# Patient Record
Sex: MALE | URBAN - NONMETROPOLITAN AREA
[De-identification: names, ages, dates, MRNs, and addresses within clinical notes are randomized per-mention and may not be internally consistent; named-entity substitution may affect disease eponyms.]

---

## 2023-08-16 ENCOUNTER — APPOINTMENT (RX ONLY)
Dept: URBAN - NONMETROPOLITAN AREA CLINIC 13 | Facility: CLINIC | Age: 28
Setting detail: DERMATOLOGY
End: 2023-08-16

## 2023-08-16 VITALS — WEIGHT: 170 LBS | HEIGHT: 75 IN

## 2023-08-16 DIAGNOSIS — D22 MELANOCYTIC NEVI: ICD-10-CM

## 2023-08-16 DIAGNOSIS — L73.2 HIDRADENITIS SUPPURATIVA: ICD-10-CM | Status: WORSENING

## 2023-08-16 PROBLEM — D22.5 MELANOCYTIC NEVI OF TRUNK: Status: ACTIVE | Noted: 2023-08-16

## 2023-08-16 PROCEDURE — 99204 OFFICE O/P NEW MOD 45 MIN: CPT

## 2023-08-16 PROCEDURE — ? PRESCRIPTION MEDICATION MANAGEMENT

## 2023-08-16 PROCEDURE — ? PRESCRIPTION

## 2023-08-16 PROCEDURE — ? COUNSELING

## 2023-08-16 RX ORDER — CLINDAMYCIN PHOSPHATE 10 MG/G
AA GEL TOPICAL QD
Qty: 60 | Refills: 2 | Status: ERX | COMMUNITY
Start: 2023-08-16

## 2023-08-16 RX ADMIN — CLINDAMYCIN PHOSPHATE AA: 10 GEL TOPICAL at 00:00

## 2023-08-16 ASSESSMENT — LOCATION SIMPLE DESCRIPTION DERM
LOCATION SIMPLE: PERINEUM
LOCATION SIMPLE: LEFT UPPER BACK

## 2023-08-16 ASSESSMENT — LOCATION DETAILED DESCRIPTION DERM
LOCATION DETAILED: LEFT MEDIAL UPPER BACK
LOCATION DETAILED: PERINEUM

## 2023-08-16 ASSESSMENT — LOCATION ZONE DERM: LOCATION ZONE: TRUNK

## 2023-08-16 NOTE — HPI: RASH (HIDRADENITIS SUPPURATIVA)
Is This A New Presentation, Or A Follow-Up?: Rash
Additional History: Referral from Jenae Ponce PA-C for hidradenitis suppurativa

## 2023-08-16 NOTE — PROCEDURE: PRESCRIPTION MEDICATION MANAGEMENT
Render In Strict Bullet Format?: No
Plan: - Discussed treatment methods including topicals, ILK, doxycycline, humira and surgery at [facility]. At this point pt would like to do topical clindamycin for maintenance. Pt will call if experiencing flare, okay to prescribe to doxycycline.
Detail Level: Zone
Initiate Treatment: clindamycin 1 % topical gel. AAA QD X2 weeks prn flares